# Patient Record
Sex: MALE | Race: WHITE | NOT HISPANIC OR LATINO | Employment: OTHER | URBAN - METROPOLITAN AREA
[De-identification: names, ages, dates, MRNs, and addresses within clinical notes are randomized per-mention and may not be internally consistent; named-entity substitution may affect disease eponyms.]

---

## 2017-06-22 ENCOUNTER — LAB VISIT (OUTPATIENT)
Dept: LAB | Facility: HOSPITAL | Age: 67
End: 2017-06-22
Attending: FAMILY MEDICINE
Payer: MEDICARE

## 2017-06-22 DIAGNOSIS — Z12.5 SPECIAL SCREENING FOR MALIGNANT NEOPLASM OF PROSTATE: ICD-10-CM

## 2017-06-22 DIAGNOSIS — I10 ESSENTIAL HYPERTENSION, MALIGNANT: Primary | ICD-10-CM

## 2017-06-22 LAB
ANION GAP SERPL CALC-SCNC: 8 MMOL/L
BUN SERPL-MCNC: 17 MG/DL
CALCIUM SERPL-MCNC: 8.8 MG/DL
CHLORIDE SERPL-SCNC: 105 MMOL/L
CHOLEST/HDLC SERPL: 3.2 {RATIO}
CO2 SERPL-SCNC: 27 MMOL/L
COMPLEXED PSA SERPL-MCNC: <0.01 NG/ML
CREAT SERPL-MCNC: 0.9 MG/DL
CREAT UR-MCNC: 219 MG/DL
EST. GFR  (AFRICAN AMERICAN): >60 ML/MIN/1.73 M^2
EST. GFR  (NON AFRICAN AMERICAN): >60 ML/MIN/1.73 M^2
GLUCOSE SERPL-MCNC: 100 MG/DL
HDL/CHOLESTEROL RATIO: 31.5 %
HDLC SERPL-MCNC: 149 MG/DL
HDLC SERPL-MCNC: 47 MG/DL
LDLC SERPL CALC-MCNC: 83 MG/DL
MICROALBUMIN UR DL<=1MG/L-MCNC: 7 UG/ML
MICROALBUMIN/CREATININE RATIO: 3.2 UG/MG
NONHDLC SERPL-MCNC: 102 MG/DL
POTASSIUM SERPL-SCNC: 4.3 MMOL/L
SODIUM SERPL-SCNC: 140 MMOL/L
TRIGL SERPL-MCNC: 95 MG/DL

## 2017-06-22 PROCEDURE — 84153 ASSAY OF PSA TOTAL: CPT

## 2017-06-22 PROCEDURE — 36415 COLL VENOUS BLD VENIPUNCTURE: CPT | Mod: PO

## 2017-06-22 PROCEDURE — 80048 BASIC METABOLIC PNL TOTAL CA: CPT

## 2017-06-22 PROCEDURE — 82043 UR ALBUMIN QUANTITATIVE: CPT

## 2017-06-22 PROCEDURE — 80061 LIPID PANEL: CPT

## 2018-01-11 ENCOUNTER — LAB VISIT (OUTPATIENT)
Dept: LAB | Facility: HOSPITAL | Age: 68
End: 2018-01-11
Attending: UROLOGY
Payer: MEDICARE

## 2018-01-11 DIAGNOSIS — C61 PROSTATE CANCER: ICD-10-CM

## 2018-01-11 LAB — COMPLEXED PSA SERPL-MCNC: <0.01 NG/ML

## 2018-01-11 PROCEDURE — 36415 COLL VENOUS BLD VENIPUNCTURE: CPT

## 2018-01-11 PROCEDURE — 84153 ASSAY OF PSA TOTAL: CPT

## 2018-01-23 ENCOUNTER — OFFICE VISIT (OUTPATIENT)
Dept: UROLOGY | Facility: CLINIC | Age: 68
End: 2018-01-23
Payer: MEDICARE

## 2018-01-23 VITALS
DIASTOLIC BLOOD PRESSURE: 74 MMHG | BODY MASS INDEX: 33.85 KG/M2 | SYSTOLIC BLOOD PRESSURE: 143 MMHG | WEIGHT: 235.88 LBS

## 2018-01-23 DIAGNOSIS — C61 PROSTATE CANCER: Primary | ICD-10-CM

## 2018-01-23 DIAGNOSIS — N52.9 ERECTILE DYSFUNCTION, UNSPECIFIED ERECTILE DYSFUNCTION TYPE: ICD-10-CM

## 2018-01-23 LAB
BILIRUB SERPL-MCNC: NORMAL MG/DL
BLOOD URINE, POC: NORMAL
COLOR, POC UA: NORMAL
GLUCOSE UR QL STRIP: NORMAL
KETONES UR QL STRIP: NORMAL
LEUKOCYTE ESTERASE URINE, POC: NORMAL
NITRITE, POC UA: NORMAL
PH, POC UA: 6
PROTEIN, POC: NORMAL
SPECIFIC GRAVITY, POC UA: 1.02
UROBILINOGEN, POC UA: NORMAL

## 2018-01-23 PROCEDURE — 99999 PR PBB SHADOW E&M-EST. PATIENT-LVL II: CPT | Mod: PBBFAC,,, | Performed by: UROLOGY

## 2018-01-23 PROCEDURE — 81002 URINALYSIS NONAUTO W/O SCOPE: CPT | Mod: S$GLB,,, | Performed by: UROLOGY

## 2018-01-23 PROCEDURE — 99214 OFFICE O/P EST MOD 30 MIN: CPT | Mod: 25,S$GLB,, | Performed by: UROLOGY

## 2018-01-23 RX ORDER — SILDENAFIL CITRATE 20 MG/1
20 TABLET ORAL
Qty: 50 TABLET | Refills: 11 | Status: SHIPPED | OUTPATIENT
Start: 2018-01-23 | End: 2019-01-30

## 2018-01-23 NOTE — PROGRESS NOTES
"Chief Complaint: [id number] Prostate cancer    HPI:   1/23/18: Doing fine feeling well.  ED stable.  SOCORRO working.  PSA undetect.  12/28/16: PSA undetectable no problems.  12/23/15: Went to PFMT and found some improvement, now satisfied.  PSA undetectable.  Discussed trimix again.  6/19/15: No changes, doing well, PSA undetectable.  Interested in PFMT  12/12/14: Doing well.  SOCORRO working fine.  No urinary bother except the occasional BEATRIZ that isn't bothering him.  9/12/14: Did not get the SOCORRO session - wll set that up.  Discussed trimix.  6/11/14: Doing well but with some need for a safety shield and a lot better than before.  Tried the viagra and at 100mg there is a little result.  3/8/14: Doing very well except for some daytime incontinence when he is moving around and walking.  At rest no problem.  He is getting squirts.  But this is improving.  Also drinking coffee in the morning and coke in there afternoon.  No erections but he hasn't really tried, but not overly bothered due to incontinence problems.  1/27/14: Path shows Gl 3+4=7 in 75-85% of prostate with extraprostatic extension and focal extension to apical margin.  Having mild incontinence with BEATRIZ.  Feeling fine otherwise.  1/14/14: RALP  12/20/13: Back to review options.  Elected prostatectomy.  Will schedule.  12/13/13: [id number] prostate cancer, Gl 4+3=7 in all areas of left, Gl 3+4=7 in all areas of right prostate.  12/4/13: TRUS/Bx 27 gm prostate with hypoechoic area right gland  11/18/13:62 yo man with elevated PSA referred for evaluation.  He takes OTC "prostaprin" for urinary frequency, nocturia x4-5, hesitancy.  Never incontinent.  No urolithiasis, hematuria or other complaints.  Sexually active no ED but not as often.  No UTI.  Does not empty bladder completely and has double voiding.  PSA recently 5.2, no prior PSA available.    Allergies:  Patient has no known allergies.    Medications:  has a current medication list which includes the following " prescription(s): lisinopril, multivitamin, and sildenafil (antihypertensive).    Review of Systems:  General: No fever, chills, fatigability, or weight loss.  Skin: No rashes, itching, or changes in color or texture of skin.  Chest: Denies CASTILLO, cyanosis, wheezing, cough, and sputum production.  Abdomen: Appetite fine. No weight loss. Denies diarrhea, abdominal pain, hematemesis, or blood in stool.  Musculoskeletal: No joint stiffness or swelling. Denies back pain.  : As above.  All other review of systems negative.    PMH:   has a past medical history of HTN (hypertension); Prostate cancer; and Sleep apnea.    PSH:   has a past surgical history that includes Knee surgery and Ankle surgery.    FamHx: family history is not on file.    SocHx:  reports that he has been smoking.  He has a 17.50 pack-year smoking history. He does not have any smokeless tobacco history on file. He reports that he does not drink alcohol or use drugs.      Physical Exam:  Vitals:    01/23/18 1621   BP: (!) 143/74     General: A&Ox3, no apparent distress, no deformities  Neck: No masses, normal thyroid  Abdomen: Soft, NT, ND. Wounds healing well.  Skin: The skin is warm and dry. No jaundice.  Ext: No c/c/e.  :   6/15: JAMES normal prostatic fossa empty    Labs/Studies:   Urinalysis performed in clinic, summary:     11/13: UA normal  Bladder Scan performed in office:     11/13: PVR 80 ml.  PSA    11/13: 5.2    2/14 - 1/18: undetect    Impression/Plan:   1. No problems from RALP.  PSA approp.  PSA in 12 with RTC 12 mo  2. SOCORRO for ED working fine.  3. Viagra refill          Copy to: Dr. Pernell Hyman

## 2019-01-22 ENCOUNTER — LAB VISIT (OUTPATIENT)
Dept: LAB | Facility: HOSPITAL | Age: 69
End: 2019-01-22
Attending: UROLOGY
Payer: MEDICARE

## 2019-01-22 DIAGNOSIS — C61 PROSTATE CANCER: ICD-10-CM

## 2019-01-22 DIAGNOSIS — N52.9 ERECTILE DYSFUNCTION, UNSPECIFIED ERECTILE DYSFUNCTION TYPE: ICD-10-CM

## 2019-01-22 LAB — COMPLEXED PSA SERPL-MCNC: <0.01 NG/ML

## 2019-01-22 PROCEDURE — 36415 COLL VENOUS BLD VENIPUNCTURE: CPT

## 2019-01-22 PROCEDURE — 84153 ASSAY OF PSA TOTAL: CPT

## 2019-01-30 ENCOUNTER — OFFICE VISIT (OUTPATIENT)
Dept: UROLOGY | Facility: CLINIC | Age: 69
End: 2019-01-30
Payer: MEDICARE

## 2019-01-30 VITALS — WEIGHT: 240.94 LBS | HEIGHT: 70 IN | BODY MASS INDEX: 34.49 KG/M2

## 2019-01-30 DIAGNOSIS — C61 PROSTATE CANCER: Primary | ICD-10-CM

## 2019-01-30 LAB
BILIRUB SERPL-MCNC: NORMAL MG/DL
BLOOD URINE, POC: NORMAL
COLOR, POC UA: YELLOW
GLUCOSE UR QL STRIP: NORMAL
KETONES UR QL STRIP: NORMAL
LEUKOCYTE ESTERASE URINE, POC: NORMAL
NITRITE, POC UA: NORMAL
PH, POC UA: 5
PROTEIN, POC: NORMAL
SPECIFIC GRAVITY, POC UA: 1.02
UROBILINOGEN, POC UA: NORMAL

## 2019-01-30 PROCEDURE — 81002 URINALYSIS NONAUTO W/O SCOPE: CPT | Mod: S$GLB,,, | Performed by: UROLOGY

## 2019-01-30 PROCEDURE — 99214 PR OFFICE/OUTPT VISIT, EST, LEVL IV, 30-39 MIN: ICD-10-PCS | Mod: 25,S$GLB,, | Performed by: UROLOGY

## 2019-01-30 PROCEDURE — 99999 PR PBB SHADOW E&M-EST. PATIENT-LVL II: ICD-10-PCS | Mod: PBBFAC,,, | Performed by: UROLOGY

## 2019-01-30 PROCEDURE — 99214 OFFICE O/P EST MOD 30 MIN: CPT | Mod: 25,S$GLB,, | Performed by: UROLOGY

## 2019-01-30 PROCEDURE — 81002 POCT URINE DIPSTICK WITHOUT MICROSCOPE: ICD-10-PCS | Mod: S$GLB,,, | Performed by: UROLOGY

## 2019-01-30 PROCEDURE — 99999 PR PBB SHADOW E&M-EST. PATIENT-LVL II: CPT | Mod: PBBFAC,,, | Performed by: UROLOGY

## 2019-01-30 NOTE — LETTER
2019        Eliceo Hyman MD  [address] [facility]  [place] [address]             [facility]  [address] [facility]  [place] [address]  Phone: [phone]  Fax: [phone]   Patient: Brice Denson   MR Number: [id number]   YOB: 1950   Date of Visit: 2019       Dear Dr. Hyman:    Thank you for referring Brice Denson to me for evaluation. Attached you will find relevant portions of my assessment and plan of care.    If you have questions, please do not hesitate to call me. I look forward to following Brice Denson along with you.    Sincerely,      Nader Armando IV, MD            CC  No Recipients    Enclosure                               MARCUS Armando IV, MD, Urologist  Name: Brice Denson, : 1950  Date: 2019      Chief Complaint: [id number] Prostate cancer    HPI:   19: No acute problems, feeling well, PSA undetect.  Reviewed history in detail.  18: Doing fine feeling well.  ED stable.  SOCORRO working.  PSA undetect.  16: PSA undetectable no problems.  12/23/15: Went to PFMT and found some improvement, now satisfied.  PSA undetectable.  Discussed trimix again.  6/19/15: No changes, doing well, PSA undetectable.  Interested in PFMT  14: Doing well.  SOCORRO working fine.  No urinary bother except the occasional BEATRIZ that isn't bothering him.  14: Did not get the SOCORRO session - wll set that up.  Discussed trimix.  14: Doing well but with some need for a safety shield and a lot better than before.  Tried the viagra and at 100mg there is a little result.  3/8/14: Doing very well except for some daytime incontinence when he is moving around and walking.  At rest no problem.  He is getting squirts.  But this is improving.  Also drinking coffee in the morning and coke in there afternoon.  No erections but he hasn't really tried, but not overly bothered due to incontinence problems.  14: Path  "shows Gl 3+4=7 in 75-85% of prostate with extraprostatic extension and focal extension to apical margin.  Having mild incontinence with BEATRIZ.  Feeling fine otherwise.  1/14/14: RALP  12/20/13: Back to review options.  Elected prostatectomy.  Will schedule.  12/13/13: [id number] prostate cancer, Gl 4+3=7 in all areas of left, Gl 3+4=7 in all areas of right prostate.  12/4/13: TRUS/Bx 27 gm prostate with hypoechoic area right gland  11/18/13:62 yo man with elevated PSA referred for evaluation.  He takes OTC "prostaprin" for urinary frequency, nocturia x4-5, hesitancy.  Never incontinent.  No urolithiasis, hematuria or other complaints.  Sexually active no ED but not as often.  No UTI.  Does not empty bladder completely and has double voiding.  PSA recently 5.2, no prior PSA available.    Allergies:  Patient has no known allergies.    Medications:  has a current medication list which includes the following prescription(s): lisinopril and multivitamin.    Review of Systems:  General: No fever, chills, fatigability, or weight loss.  Skin: No rashes, itching, or changes in color or texture of skin.  Chest: Denies CASTILLO, cyanosis, wheezing, cough, and sputum production.  Abdomen: Appetite fine. No weight loss. Denies diarrhea, abdominal pain, hematemesis, or blood in stool.  Musculoskeletal: No joint stiffness or swelling. Denies back pain.  : As above.  All other review of systems negative.    PMH:   has a past medical history of HTN (hypertension), Prostate cancer, and Sleep apnea.    PSH:   has a past surgical history that includes Knee surgery and Ankle surgery.    FamHx: family history is not on file.    SocHx:  reports that he has been smoking.  He has a 17.50 pack-year smoking history. He does not have any smokeless tobacco history on file. He reports that he does not drink alcohol or use drugs.      Physical Exam:  There were no vitals filed for this visit.  General: A&Ox3, no apparent distress, no deformities  Neck: " No masses, normal thyroid  Abdomen: Soft, NT, ND. Wounds healing well.  Skin: The skin is warm and dry. No jaundice.  Ext: No c/c/e.  :   6/15: JAMES normal prostatic fossa empty    Labs/Studies:   Urinalysis performed in clinic, summary:     11/13: UA normal  Bladder Scan performed in office:     11/13: PVR 80 ml.  PSA    11/13: 5.2    2/14 - 1/19: undetect    Impression/Plan:   1. No problems from RALP.  PSA approp.  No recurrence at 5 years  2. SOCORRO working fine/Viagra refill  3. D/C from clinic to Dr. Hyman (PCP)

## 2019-01-30 NOTE — PROGRESS NOTES
"Chief Complaint: [id number] Prostate cancer    HPI:   1/30/19: No acute problems, feeling well, PSA undetect.  Reviewed history in detail.  1/23/18: Doing fine feeling well.  ED stable.  SOCORRO working.  PSA undetect.  12/28/16: PSA undetectable no problems.  12/23/15: Went to PFMT and found some improvement, now satisfied.  PSA undetectable.  Discussed trimix again.  6/19/15: No changes, doing well, PSA undetectable.  Interested in PFMT  12/12/14: Doing well.  SOCORRO working fine.  No urinary bother except the occasional BEATRIZ that isn't bothering him.  9/12/14: Did not get the SOCORRO session - wll set that up.  Discussed trimix.  6/11/14: Doing well but with some need for a safety shield and a lot better than before.  Tried the viagra and at 100mg there is a little result.  3/8/14: Doing very well except for some daytime incontinence when he is moving around and walking.  At rest no problem.  He is getting squirts.  But this is improving.  Also drinking coffee in the morning and coke in there afternoon.  No erections but he hasn't really tried, but not overly bothered due to incontinence problems.  1/27/14: Path shows Gl 3+4=7 in 75-85% of prostate with extraprostatic extension and focal extension to apical margin.  Having mild incontinence with BEATRIZ.  Feeling fine otherwise.  1/14/14: RALP  12/20/13: Back to review options.  Elected prostatectomy.  Will schedule.  12/13/13: [id number] prostate cancer, Gl 4+3=7 in all areas of left, Gl 3+4=7 in all areas of right prostate.  12/4/13: TRUS/Bx 27 gm prostate with hypoechoic area right gland  11/18/13:64 yo man with elevated PSA referred for evaluation.  He takes OTC "prostaprin" for urinary frequency, nocturia x4-5, hesitancy.  Never incontinent.  No urolithiasis, hematuria or other complaints.  Sexually active no ED but not as often.  No UTI.  Does not empty bladder completely and has double voiding.  PSA recently 5.2, no prior PSA available.    Allergies:  Patient has no known " allergies.    Medications:  has a current medication list which includes the following prescription(s): lisinopril and multivitamin.    Review of Systems:  General: No fever, chills, fatigability, or weight loss.  Skin: No rashes, itching, or changes in color or texture of skin.  Chest: Denies CASTILLO, cyanosis, wheezing, cough, and sputum production.  Abdomen: Appetite fine. No weight loss. Denies diarrhea, abdominal pain, hematemesis, or blood in stool.  Musculoskeletal: No joint stiffness or swelling. Denies back pain.  : As above.  All other review of systems negative.    PMH:   has a past medical history of HTN (hypertension), Prostate cancer, and Sleep apnea.    PSH:   has a past surgical history that includes Knee surgery and Ankle surgery.    FamHx: family history is not on file.    SocHx:  reports that he has been smoking.  He has a 17.50 pack-year smoking history. He does not have any smokeless tobacco history on file. He reports that he does not drink alcohol or use drugs.      Physical Exam:  There were no vitals filed for this visit.  General: A&Ox3, no apparent distress, no deformities  Neck: No masses, normal thyroid  Abdomen: Soft, NT, ND. Wounds healing well.  Skin: The skin is warm and dry. No jaundice.  Ext: No c/c/e.  :   6/15: JAMES normal prostatic fossa empty    Labs/Studies:   Urinalysis performed in clinic, summary:     11/13: UA normal  Bladder Scan performed in office:     11/13: PVR 80 ml.  PSA    11/13: 5.2    2/14 - 1/19: undetect    Impression/Plan:   1. No problems from RALP.  PSA approp.  No recurrence at 5 years  2. SOCORRO working fine/Viagra refill  3. D/C from clinic to Dr. Hyman (PCP)